# Patient Record
Sex: FEMALE | Race: BLACK OR AFRICAN AMERICAN | Employment: UNEMPLOYED | URBAN - METROPOLITAN AREA
[De-identification: names, ages, dates, MRNs, and addresses within clinical notes are randomized per-mention and may not be internally consistent; named-entity substitution may affect disease eponyms.]

---

## 2019-04-19 ENCOUNTER — HOSPITAL ENCOUNTER (EMERGENCY)
Age: 54
Discharge: HOME | End: 2019-04-19
Attending: EMERGENCY MEDICINE
Payer: COMMERCIAL

## 2019-04-19 ENCOUNTER — APPOINTMENT (OUTPATIENT)
Dept: GENERAL RADIOLOGY | Age: 54
End: 2019-04-19
Attending: EMERGENCY MEDICINE
Payer: COMMERCIAL

## 2019-04-19 VITALS
RESPIRATION RATE: 18 BRPM | DIASTOLIC BLOOD PRESSURE: 120 MMHG | TEMPERATURE: 98 F | SYSTOLIC BLOOD PRESSURE: 194 MMHG | OXYGEN SATURATION: 98 % | HEART RATE: 73 BPM

## 2019-04-19 DIAGNOSIS — I10 ESSENTIAL HYPERTENSION: ICD-10-CM

## 2019-04-19 DIAGNOSIS — G89.29 ACUTE EXACERBATION OF CHRONIC LOW BACK PAIN: ICD-10-CM

## 2019-04-19 DIAGNOSIS — M54.50 ACUTE EXACERBATION OF CHRONIC LOW BACK PAIN: ICD-10-CM

## 2019-04-19 DIAGNOSIS — F11.90 CHRONIC, CONTINUOUS USE OF OPIOIDS: ICD-10-CM

## 2019-04-19 DIAGNOSIS — F17.200 SMOKING ADDICTION: ICD-10-CM

## 2019-04-19 DIAGNOSIS — J20.9 ACUTE BRONCHITIS, UNSPECIFIED ORGANISM: Primary | ICD-10-CM

## 2019-04-19 PROCEDURE — 71046 X-RAY EXAM CHEST 2 VIEWS: CPT

## 2019-04-19 PROCEDURE — 99282 EMERGENCY DEPT VISIT SF MDM: CPT

## 2019-04-19 PROCEDURE — 74011250636 HC RX REV CODE- 250/636: Performed by: PHYSICIAN ASSISTANT

## 2019-04-19 PROCEDURE — 96372 THER/PROPH/DIAG INJ SC/IM: CPT

## 2019-04-19 RX ORDER — AZITHROMYCIN 250 MG/1
TABLET, FILM COATED ORAL
Qty: 6 TAB | Refills: 0 | Status: SHIPPED | OUTPATIENT
Start: 2019-04-19

## 2019-04-19 RX ORDER — PREDNISONE 10 MG/1
TABLET ORAL
Qty: 21 TAB | Refills: 0 | Status: SHIPPED | OUTPATIENT
Start: 2019-04-19

## 2019-04-19 RX ORDER — BACLOFEN 10 MG/1
10 TABLET ORAL 3 TIMES DAILY
Qty: 20 TAB | Refills: 0 | Status: SHIPPED | OUTPATIENT
Start: 2019-04-19

## 2019-04-19 RX ORDER — IBUPROFEN 800 MG/1
800 TABLET ORAL
Qty: 20 TAB | Refills: 0 | Status: SHIPPED | OUTPATIENT
Start: 2019-04-19 | End: 2019-04-26

## 2019-04-19 RX ORDER — KETOROLAC TROMETHAMINE 30 MG/ML
60 INJECTION, SOLUTION INTRAMUSCULAR; INTRAVENOUS
Status: COMPLETED | OUTPATIENT
Start: 2019-04-19 | End: 2019-04-19

## 2019-04-19 RX ADMIN — KETOROLAC TROMETHAMINE 60 MG: 30 INJECTION, SOLUTION INTRAMUSCULAR at 23:03

## 2019-04-19 NOTE — LETTER
[address] 
\[facility]\"" EMERGENCY DEPT 
[address] [place] [address] 
[phone] Work/School Note Date: 4/19/2019 To Whom It May concern: 
 
Payton Talbert was seen and treated today in the emergency room by the following provider(s): 
Attending Provider: Pardeep Franz MD 
Physician Assistant: THEA Euceda. Payton Talbert may return to work on [phone]. Sincerely, THEA Cardenas

## 2019-04-20 NOTE — ED PROVIDER NOTES
EMERGENCY DEPARTMENT HISTORY AND PHYSICAL EXAM 
 
 
Date: 4/19/2019 Patient Name: Alyssa Hollis History of Presenting Illness Chief Complaint Patient presents with  Back Pain  
  lower back pain radiating to her right leg. 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 Took percocet at [place]  Cough \"coughing up yellow stuff for 3 days\" History Provided By: Patient HPI: Alyssa Hollis, 47 y.o. female with a history of chronic low back pain, hypertension and cigarette smoking presents ambulatory to the ED with cc of several days of 10 out of 10 constant, aching lower back pain that is worse with bending and moving and is no better after taking her prescribed Percocet. She has a history of chronic, continuous opioid use for her chronic low back pain. She also has a productive cough without fever and believes that this cough has worsened her lower back pain. 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 Denies saddle anesthesia, abdominal pain, bowel or bladder symptoms, radiating pain, weakness, numbness or fever. There are no other complaints, changes, or physical findings at this time. PCP: Other, MD Lynne 
 
Current Outpatient Medications Medication Sig Dispense Refill  predniSONE (STERAPRED DS) 10 mg dose pack Per Dose Pack instructions 21 Tab 0  
 baclofen (LIORESAL) 10 mg tablet Take 1 Tab by mouth three (3) times daily. 20 Tab 0  ibuprofen (MOTRIN) 800 mg tablet Take 1 Tab by mouth every eight (8) hours as needed for Pain for up to 7 days.  20 Tab 0  
 azithromycin (ZITHROMAX Z-RUBÉN) 250 mg tablet Take 2 tablets (500mg) on day 1; then take 1 tablet (250 mg) on days 2, 3, 4 and 5 6 Tab 0  
 HYDROmorphone (DILAUDID) 2 mg tablet Take 1 Tab by mouth every four (4) hours as needed for Pain. Max Daily Amount: 12 mg. 10 Tab 0  
 diazepam (VALIUM) 5 mg tablet Take 1 Tab by mouth every eight (8) hours as needed (spasm). Max Daily Amount: 15 mg. 15 Tab 0  predniSONE (STERAPRED DS) 10 mg dose pack Take as directed on taper package 21 Tab 0  
 lisinopril (PRINIVIL, ZESTRIL) 20 mg tablet Take 20 mg by mouth daily.  amLODIPine-valsartan (EXFORGE)  mg per tablet Take 1 Tab by mouth two (2) times a day.  oxyCODONE-acetaminophen (PERCOCET) 5-325 mg per tablet Take 1 Tab by mouth every four (4) hours as needed for Pain. 20 Tab 0  
 fluticasone (FLONASE) 50 mcg/actuation nasal spray nightly.  aspirin delayed-release 81 mg tablet Take  by mouth daily. Past History Past Medical History: 
Past Medical History:  
Diagnosis Date  Asthma  Chronic pain   
 back  GERD (gastroesophageal reflux disease)  Hypercholesteremia  Hypertension  Other ill-defined conditions(799.89)   
 blind in right eye  Stroke [facility]) 2001 TIA x2 Past Surgical History: 
Past Surgical History:  
Procedure Laterality Date  HX GYN    
 ovarian surgery  HX HEENT    
 sinus  HX HYSTERECTOMY  VASCULAR SURGERY PROCEDURE UNLIST    
 carotid stent right Family History: 
Family History Problem Relation Age of Onset  Coronary Artery Disease Brother  Heart Surgery Brother  Heart Disease Brother  Coronary Artery Disease Brother  Heart Disease Brother  Cancer Father  Cancer Sister Social History: 
Social History Tobacco Use  Smoking status: Current Every Day Smoker Packs/day: 0.50 Years: 30.00 Pack years: 15.00 Substance Use Topics  Alcohol use: Yes Comment: 1 beer per month  Drug use: No  
 
 
Allergies: Allergies Allergen Reactions  Morphine Itching  Sulfa (Sulfonamide Antibiotics) Other (comments) \"my liver got inflammed\" Review of Systems Review of Systems Constitutional: Negative for fatigue and fever. HENT: Negative for ear pain and sore throat. Eyes: Negative for pain, redness and visual disturbance. Respiratory: Positive for cough. Negative for shortness of breath. Cardiovascular: Negative for chest pain and palpitations. Gastrointestinal: Negative for abdominal pain, nausea and vomiting. Genitourinary: Negative for dysuria, frequency and urgency. Musculoskeletal: Positive for back pain. Negative for gait problem, neck pain and neck stiffness. Skin: Negative for rash and wound. Neurological: Negative for dizziness, weakness, light-headedness, numbness and headaches. Physical Exam  
Physical Exam  
Constitutional: She is oriented to person, place, and time. She appears well-developed and well-nourished. Non-toxic appearance. No distress. HENT:  
Head: Normocephalic and atraumatic. Right Ear: External ear normal.  
Left Ear: External ear normal.  
Nose: Nose normal.  
Mouth/Throat: Uvula is midline. No trismus in the jaw. Eyes: Pupils are equal, round, and reactive to light. Conjunctivae and EOM are normal. No scleral icterus. Neck: Normal range of motion and full passive range of motion without pain. Cardiovascular: Normal rate and regular rhythm. Pulmonary/Chest: Effort normal. No accessory muscle usage. No tachypnea. No respiratory distress. She has no decreased breath sounds. She has no wheezes. Breathing unlabored; lungs are clear; no wheezing Abdominal: Soft. There is no tenderness. Musculoskeletal: Normal range of motion. Lumbar back: She exhibits tenderness. Back: LOWER BACK: 
No bruising, redness or swelling. No step off. Diffuse muscular discomfort. No CVA tenderness Neurological: She is alert and oriented to person, place, and time. She is not disoriented. No cranial nerve deficit. GCS eye subscore is 4. GCS verbal subscore is 5. GCS motor subscore is 6. Skin: Skin is intact. No rash noted. Psychiatric: She has a normal mood and affect. Her speech is normal.  
Nursing note and vitals reviewed. Diagnostic Study Results Labs - No results found for this or any previous visit (from the past 12 hour(s)). Radiologic Studies -  
XR CHEST PA LAT Final Result IMPRESSION:  
1. No radiographic evidence of acute cardiopulmonary disease. CT Results  (Last 48 hours) None CXR Results  (Last 48 hours) 04/19/19 2151  XR CHEST PA LAT Final result Impression:  IMPRESSION:  
1. No radiographic evidence of acute cardiopulmonary disease. Narrative:  INDICATION: . productive cough x 3 days Additional history: COMPARISON: Previous chest xray, 2/7/2013 and 12/19/2008. Candance Huger FINDINGS: PA and lateral view of the chest.   
.  
Lines/tubes/surgical: None. Heart/mediastinum: Unremarkable. Lungs/pleura: Minimal linear opacity in the lateral aspect of the left base  
suggesting scarring/atelectasis. No visualized pleural effusion or pneumothorax. Additional Comments: None. .  
  
  
 
Medical Decision Making I am the first provider for this patient. I reviewed the vital signs, available nursing notes, past medical history, past surgical history, family history and social history. Vital Signs-Reviewed the patient's vital signs. Patient Vitals for the past 12 hrs: 
 Temp Pulse Resp BP SpO2  
04/19/19 2320    (!) 194/120   
04/19/19 2133     98 % 04/19/19 2124 98 °F (36.7 °C) 73 18 (!) 186/116 98 % Pulse Oximetry Analysis - 98% on RA Records Reviewed: Nursing Notes, Old Medical Records, Previous Radiology Studies, Previous Laboratory Studies and  Provider Notes (Medical Decision Making): DDx: Pneumonia, bronchitis, smoking addiction, chronic continuous opioid use, chronic low back pain, hypertension, HNP, DDD 
 
TOBACCO COUNSELING: 
 Spent 5 minutes discussing the risks of smoking and the benefits of smoking cessation as well as the long term sequelae of smoking with the pt who verbalized his understanding. Reviewed strategies for success, including gradually decreasing the number of cigarettes smoked a day. HYPERTENSION COUNSELING: 
Patient denies chest pain, headache, shortness of breath. Patient is made aware of their elevated blood pressure and is instructed to follow up this week with their Primary Care for a recheck. Patient is counseled regarding consequences of chronic, uncontrolled hypertension including kidney disease, heart disease, stroke or even death. Patient states their understanding. ED Course:  
Initial assessment performed. The patients presenting problems have been discussed, and they are in agreement with the care plan formulated and outlined with them. I have encouraged them to ask questions as they arise throughout their visit. Disposition: 
Discharge PLAN: 
1. Discharge Medication List as of 4/19/2019 11:14 PM  
  
START taking these medications Details ! ! predniSONE (STERAPRED DS) 10 mg dose pack Per Dose Pack instructions, Print, Disp-21 Tab, R-0  
  
baclofen (LIORESAL) 10 mg tablet Take 1 Tab by mouth three (3) times daily. , Print, Disp-20 Tab, R-0  
  
ibuprofen (MOTRIN) 800 mg tablet Take 1 Tab by mouth every eight (8) hours as needed for Pain for up to 7 days. , Print, Disp-20 Tab, R-0  
  
azithromycin (ZITHROMAX Z-RUBÉN) 250 mg tablet Take 2 tablets (500mg) on day 1; then take 1 tablet (250 mg) on days 2, 3, 4 and 5, Print, Disp-6 Tab, R-0  
  
 !! - Potential duplicate medications found. Please discuss with provider. CONTINUE these medications which have NOT CHANGED Details HYDROmorphone (DILAUDID) 2 mg tablet Take 1 Tab by mouth every four (4) hours as needed for Pain.  Max Daily Amount: 12 mg., Print, Disp-10 Tab, R-0  
  
 diazepam (VALIUM) 5 mg tablet Take 1 Tab by mouth every eight (8) hours as needed (spasm). Max Daily Amount: 15 mg., Print, Disp-15 Tab, R-0  
  
!! predniSONE (STERAPRED DS) 10 mg dose pack Take as directed on taper package, Print, Disp-21 Tab, R-0  
  
lisinopril (PRINIVIL, ZESTRIL) 20 mg tablet Take 20 mg by mouth daily. , Historical Med  
  
amLODIPine-valsartan (EXFORGE)  mg per tablet Take 1 Tab by mouth two (2) times a day., Historical Med  
  
oxyCODONE-acetaminophen (PERCOCET) 5-325 mg per tablet Take 1 Tab by mouth every four (4) hours as needed for Pain. Print, 1 Tab, Disp-20 Tab, R-0  
  
fluticasone (FLONASE) 50 mcg/actuation nasal spray nightly. Historical Med  
  
aspirin delayed-release 81 mg tablet Take  by mouth daily. Historical Med  
  
 !! - Potential duplicate medications found. Please discuss with provider. 2.  
Follow-up Information Follow up With Specialties Details Why Contact Info Santhosh Sanchez MD Family Practice Schedule an appointment as soon as possible for a visit PRIMARY CARE: call to schedule follow up [address] [address] 
[phone] Return to ED if worse Diagnosis Clinical Impression: 1. Acute bronchitis, unspecified organism 2. Chronic, continuous use of opioids 3. Acute exacerbation of chronic low back pain 4. Essential hypertension 5. Smoking addiction

## 2019-04-20 NOTE — ED NOTES
BP elevated. Hx of HTN. Pt takes 1 of 3 BP meds at night. Pt reports elevated BP due to pain. Provider made aware. Pt denies symptoms. Pt medicated per orders for pain prior to d/c. Provider reviewed DIC with patient who verbalized understanding. Pt ambulatory to vehicle.